# Patient Record
(demographics unavailable — no encounter records)

---

## 2025-07-17 NOTE — PHYSICAL EXAM
[Appropriately responsive] : appropriately responsive [Alert] : alert [No Acute Distress] : no acute distress [Soft] : soft [Non-tender] : non-tender [Non-distended] : non-distended [No Lesions] : no lesions [No Mass] : no mass [Oriented x3] : oriented x3 [Examination Of The Breasts] : a normal appearance [No Masses] : no breast masses were palpable [Labia Majora] : normal [Labia Minora] : normal [Normal] :  normal

## 2025-07-17 NOTE — HISTORY OF PRESENT ILLNESS
[FreeTextEntry1] : Pt is a 17 year G0 LMP 6/15 presenting today to establish gyn care.   Pt reports regular monthly menses, not too painful or too heavy. Pt c/o worsening cystic acne with her menses mostly on her chin & her chest. Pt reports having hormones tested at dermatologist last month which revealed elevated testosterone levels. She has tried using doxycycline with mild improvement but states she is starting to have stomach upset. Denies breast pain, abnormal nipple discharge, abdominal pain, abnormal uterine bleeding, vaginal discharge/irritation, dysuria.  Pt is not sexually active, interested in males. Pt reports regular exercise.   PHQ9:0   OB: denies Gyn: denies fibroids, endometriosis, ovarian cysts, STIs PMH: hormonal acne PSH: denies FMHx: denies family h/o breast, ovarian, uterine, or colon cancer Meds: doxycycline Allx: NKDA Social: denies alcohol use, denies tobacco use, denies drug use; pt is starting college at [employer] this year

## 2025-07-17 NOTE — PLAN
[FreeTextEntry1] : #Well person exam -Medical/surgical/family history reviewed -Allergies and medications reconciled -discussed safe sexual practices   #hormonal acne -elevated testosterone on recent lab testing, pt to have results sent over -plan for pelvic sono to eval for polycystic ovaries -discussed possible dx of PCOS -discussed options for OCPs vs spironolactone for acne management -plan to start Sara & possibly add spironolactone in the future   All questions/concerns of pt addressed to their satisfaction.   F/u in 3 months for telehealth.